# Patient Record
Sex: MALE | Race: WHITE | NOT HISPANIC OR LATINO | Employment: OTHER | URBAN - METROPOLITAN AREA
[De-identification: names, ages, dates, MRNs, and addresses within clinical notes are randomized per-mention and may not be internally consistent; named-entity substitution may affect disease eponyms.]

---

## 2017-08-15 ENCOUNTER — OFFICE VISIT (OUTPATIENT)
Dept: URGENT CARE | Facility: PHYSICIAN GROUP | Age: 32
End: 2017-08-15
Payer: OTHER GOVERNMENT

## 2017-08-15 VITALS
TEMPERATURE: 99.3 F | BODY MASS INDEX: 40.66 KG/M2 | DIASTOLIC BLOOD PRESSURE: 72 MMHG | OXYGEN SATURATION: 94 % | WEIGHT: 284 LBS | HEART RATE: 108 BPM | SYSTOLIC BLOOD PRESSURE: 128 MMHG | HEIGHT: 70 IN | RESPIRATION RATE: 18 BRPM

## 2017-08-15 DIAGNOSIS — E66.01 MORBID OBESITY WITH BMI OF 40.0-44.9, ADULT (HCC): ICD-10-CM

## 2017-08-15 DIAGNOSIS — J02.0 ACUTE STREPTOCOCCAL PHARYNGITIS: ICD-10-CM

## 2017-08-15 LAB
INT CON NEG: NORMAL
INT CON POS: NORMAL
S PYO AG THROAT QL: NORMAL

## 2017-08-15 PROCEDURE — 99214 OFFICE O/P EST MOD 30 MIN: CPT | Performed by: FAMILY MEDICINE

## 2017-08-15 PROCEDURE — 87880 STREP A ASSAY W/OPTIC: CPT | Performed by: FAMILY MEDICINE

## 2017-08-15 RX ORDER — AMOXICILLIN 875 MG/1
TABLET, COATED ORAL
Qty: 20 TAB | Refills: 0 | Status: SHIPPED | OUTPATIENT
Start: 2017-08-15 | End: 2021-07-06

## 2017-08-15 NOTE — MR AVS SNAPSHOT
"        Go Johnson   8/15/2017 8:15 AM   Office Visit   MRN: [id number]    Department:  [facility]   Dept Phone:  [phone]    Description:  Male : 1985   Provider:  Darryl Self M.D.           Reason for Visit     Pharyngitis x 7 days      Allergies as of 8/15/2017     No Known Allergies      You were diagnosed with     Acute streptococcal pharyngitis   [683963]       Morbid obesity with BMI of 40.0-44.9, adult (CMS-[facility])   [[id number]]         Vital Signs     Blood Pressure Pulse Temperature Respirations Height Weight    128/72 mmHg 108 37.4 °C (99.3 °F) 18 1.778 m (5' 10\") 128.822 kg (284 lb)    Body Mass Index Oxygen Saturation Smoking Status             40.75 kg/m2 94% Current Every Day Smoker         Basic Information     Date Of Birth Sex Race Ethnicity Preferred Language    1985 Male White Non- English      Problem List              ICD-10-CM Priority Class Noted - Resolved    Morbid obesity with BMI of 40.0-44.9, adult ([facility]) E66.01, Z68.41   8/15/2017 - Present      Health Maintenance        Date Due Completion Dates    IMM DTaP/Tdap/Td Vaccine (1 - Tdap) 2006 ---    IMM INFLUENZA (1) 2017 ---            Results     POCT Rapid Strep A      Component    Rapid Strep Screen    POS    Internal Control Positive    Valid    Internal Control Negative    Valid                        Current Immunizations     No immunizations on file.      Below and/or attached are the medications your provider expects you to take. Review all of your home medications and newly ordered medications with your provider and/or pharmacist. Follow medication instructions as directed by your provider and/or pharmacist. Please keep your medication list with you and share with your provider. Update the information when medications are discontinued, doses are changed, or new medications (including over-the-counter products) are added; and carry medication information at all times in the event of " emergency situations     Allergies:  No Known Allergies          Medications  Valid as of: August 15, 2017 -  8:37 AM    Generic Name Brand Name Tablet Size Instructions for use    Amoxicillin (Tab) AMOXIL 875 MG 1 TAB TWICE A DAY X 10 DAYS.        Sertraline HCl (Tab) ZOLOFT 100 MG Take 100 mg by mouth every day.        .                 Medicines prescribed today were sent to:     [facility] [address] [place] [address], [place] - [address] [facility]    [address] [place] [address]    Phone: [phone] Fax: [phone]    Open 24 Hours?: No      Medication refill instructions:       If your prescription bottle indicates you have medication refills left, it is not necessary to call your provider’s office. Please contact your pharmacy and they will refill your medication.    If your prescription bottle indicates you do not have any refills left, you may request refills at any time through one of the following ways: The online [facility] system (except Urgent Care), by calling your provider’s office, or by asking your pharmacy to contact your provider’s office with a refill request. Medication refills are processed only during regular business hours and may not be available until the next business day. Your provider may request additional information or to have a follow-up visit with you prior to refilling your medication.   *Please Note: Medication refills are assigned a new Rx number when refilled electronically. Your pharmacy may indicate that no refills were authorized even though a new prescription for the same medication is available at the pharmacy. Please request the medicine by name with the pharmacy before contacting your provider for a refill.           [facility] Access Code: Activation code not generated  Current [facility] Status: Active          Quit Tobacco Information     Do you want to quit using tobacco?    Quitting tobacco decreases risks of cancer, heart and lung disease, increases life expectancy,  improves sense of taste and smell, and increases spending money, among other benefits.    If you are thinking about quitting, we can help.  • [facility] Quit Tobacco Program: [phone]  o Program occurs weekly for four weeks and includes pharmacist consultation on products to support quitting smoking or chewing tobacco. A provider referral is needed for pharmacist consultation.  • Tobacco Users Help Hotline: 9-800QUIT-NOW (176-4841) or https://[place].quitlogix.org/  o Free, confidential telephone and online coaching for [place] residents. Sessions are designed on a schedule that is convenient for you. Eligible clients receive free nicotine replacement therapy.  • Nationally: www.smokefree.gov  o Information and professional assistance to support both immediate and long-term needs as you become, and remain, a non-smoker. Smokefree.gov allows you to choose the help that best fits your needs.

## 2017-08-15 NOTE — PROGRESS NOTES
Chief Complaint:    Chief Complaint   Patient presents with   • Pharyngitis     x 7 days       History of Present Illness:    This is a new problem. Symptoms x 7 days; has sore throat, 8/10 severity. Has had subjective fever. No nasal symptoms or cough.      Review of Systems:    Constitutional: See HPI.  Eyes: Negative for change in vision, photophobia, pain, redness, and discharge.  ENT: See HPI.  Respiratory: Negative for cough, hemoptysis, sputum production, shortness of breath, wheezing, and stridor.    Cardiovascular: Negative for chest pain, palpitations, orthopnea, claudication, leg swelling, and PND.   Gastrointestinal: Negative for abdominal pain, nausea, vomiting, diarrhea, constipation, blood in stool, and melena.   Genitourinary: Negative for dysuria, urinary urgency, urinary frequency, hematuria, and flank pain.   Musculoskeletal: Negative for myalgias, joint pain, neck pain, and back pain.   Skin: Negative for rash and itching.   Neurological: Negative for dizziness, tingling, tremors, sensory change, speech change, focal weakness, seizures, loss of consciousness, and headaches.   Endo: Negative for polydipsia.   Heme: Does not bruise/bleed easily.   Psychiatric/Behavioral: Negative for depression, suicidal ideas, hallucinations, memory loss and substance abuse. The patient is not nervous/anxious and does not have insomnia.      Past Medical History:    History reviewed. No pertinent past medical history.    Past Surgical History:    Past Surgical History   Procedure Laterality Date   • Pilonidal cyst excision     • Pilonidal cyst excision  2008   • Pilonidal cyst excision  12/7/2012     Performed by Ulises Carvalho M.D. at [facility]       Social History:    Social History     Social History   • Marital Status:      Spouse Name: N/A   • Number of Children: N/A   • Years of Education: N/A     Social History Main Topics   • Smoking status: Current Every Day Smoker -- 1.00 packs/day  "for 10 years     Types: Cigarettes   • Smokeless tobacco: Never Used   • Alcohol Use: 0.0 oz/week     0 Standard drinks or equivalent per week      Comment: weekly   • Drug Use: No   • Sexual Activity: Not Asked     Other Topics Concern   • None     Social History Narrative       Family History:    History reviewed. No pertinent family history.    Medications:    Current Outpatient Prescriptions on File Prior to Visit   Medication Sig Dispense Refill   • sertraline (ZOLOFT) 100 MG Tab Take 100 mg by mouth every day.       No current facility-administered medications on file prior to visit.       Allergies:    No Known Allergies      Vitals:    Filed Vitals:    08/15/17 0813   BP: 128/72   Pulse: 108   Temp: 37.4 °C (99.3 °F)   Resp: 18   Height: 1.778 m (5' 10\")   Weight: 128.822 kg (284 lb)   SpO2: 94%       Physical Exam:    Constitutional: Vital signs reviewed. Appears well-developed and well-nourished. No acute distress.   Eyes: Sclera white, conjunctivae clear.   ENT: External ears normal. Hearing normal. Nasal mucosa pink. Lips/teeth are normal. Oral mucosa pink and moist. Posterior pharynx is moderately erythematous with mild-moderate swelling and purulent mucus.  Neck: Neck supple.   Pulmonary/Chest: Respirations non-labored.  Lymph: Cervical nodes without tenderness or enlargement.  Musculoskeletal: Normal gait. Normal range of motion. No muscular atrophy or weakness.  Neurological: Alert and oriented to person, place, and time. Muscle tone normal. Coordination normal.   Skin: No rashes or lesions. Warm, dry, normal turgor.  Psychiatric: Normal mood and affect. Behavior is normal. Judgment and thought content normal.     Diagnostics:    POCT RAPID STREP A (Order #[id number]) on 8/15/17       Component Results      Component     Rapid Strep Screen     POS     Internal Control Positive     Valid     Internal Control Negative     Valid         Last Resulted Time     Tue Aug 15, 2017  8:31 AM       Assessment / " Plan:    1. Acute streptococcal pharyngitis  - POCT Rapid Strep A  - amoxicillin (AMOXIL) 875 MG tablet; 1 TAB TWICE A DAY X 10 DAYS.  Dispense: 20 Tab; Refill: 0    2. Morbid obesity with BMI of 40.0-44.9, adult (CMS-[facility])  - Patient identified as having weight management issue.  Appropriate orders and counseling given.      Discussed with him DDX and management options.    Agreeable to medication prescribed.    Follow-up with PCP or urgent care if getting worse or not better with above.

## 2021-07-06 ENCOUNTER — OFFICE VISIT (OUTPATIENT)
Dept: URGENT CARE | Facility: CLINIC | Age: 36
End: 2021-07-06
Payer: COMMERCIAL

## 2021-07-06 VITALS
TEMPERATURE: 97.8 F | SYSTOLIC BLOOD PRESSURE: 126 MMHG | HEART RATE: 98 BPM | RESPIRATION RATE: 17 BRPM | WEIGHT: 310 LBS | HEIGHT: 70 IN | OXYGEN SATURATION: 95 % | BODY MASS INDEX: 44.38 KG/M2 | DIASTOLIC BLOOD PRESSURE: 78 MMHG

## 2021-07-06 DIAGNOSIS — H00.031 CELLULITIS OF RIGHT UPPER EYELID: ICD-10-CM

## 2021-07-06 PROBLEM — L03.213 PRESEPTAL CELLULITIS OF RIGHT UPPER EYELID: Status: ACTIVE | Noted: 2021-07-06

## 2021-07-06 PROCEDURE — 99203 OFFICE O/P NEW LOW 30 MIN: CPT | Performed by: NURSE PRACTITIONER

## 2021-07-06 RX ORDER — AMOXICILLIN AND CLAVULANATE POTASSIUM 875; 125 MG/1; MG/1
1 TABLET, FILM COATED ORAL 2 TIMES DAILY
Qty: 14 TABLET | Refills: 0 | Status: SHIPPED
Start: 2021-07-06 | End: 2021-07-08

## 2021-07-06 RX ORDER — SULFAMETHOXAZOLE AND TRIMETHOPRIM 800; 160 MG/1; MG/1
1 TABLET ORAL 2 TIMES DAILY
Qty: 14 TABLET | Refills: 0 | Status: SHIPPED
Start: 2021-07-06 | End: 2021-07-08

## 2021-07-06 ASSESSMENT — ENCOUNTER SYMPTOMS
EYE DISCHARGE: 0
HEADACHES: 0
ROS SKIN COMMENTS: RIGHT UPPER EYELID SWELLING
PHOTOPHOBIA: 0
EYE PAIN: 0
EYE REDNESS: 0
TINGLING: 0
NAUSEA: 0
FEVER: 0
BLURRED VISION: 0
SENSORY CHANGE: 0
DOUBLE VISION: 0
CHILLS: 0

## 2021-07-06 NOTE — PROGRESS NOTES
Subjective:      Go Johnson is a 35 y.o. male who presents with Eye Problem (stye on right eye x 3 days )            HPI   New problem.  Go is a 35-year-old male who presents with swelling to his right upper eyelid x3 days.  He reports associated erythema and mild tenderness to this area.  He denies any redness of the eye itself or discharge.  He denies blurred or double vision, fever, chills, headache, or nausea.  He has not done any treatment for this at this point.  Patient has no known allergies.  No current outpatient medications on file prior to visit.     No current facility-administered medications on file prior to visit.     Social History     Socioeconomic History   • Marital status:      Spouse name: Not on file   • Number of children: Not on file   • Years of education: Not on file   • Highest education level: Not on file   Occupational History   • Not on file   Tobacco Use   • Smoking status: Current Every Day Smoker     Packs/day: 1.00     Years: 10.00     Pack years: 10.00     Types: Cigarettes   • Smokeless tobacco: Never Used   Substance and Sexual Activity   • Alcohol use: Yes     Alcohol/week: 0.0 oz     Comment: weekly   • Drug use: No   • Sexual activity: Not on file   Other Topics Concern   • Not on file   Social History Narrative   • Not on file     Social Determinants of Health     Financial Resource Strain:    • Difficulty of Paying Living Expenses:    Food Insecurity:    • Worried About Running Out of Food in the Last Year:    • Ran Out of Food in the Last Year:    Transportation Needs:    • Lack of Transportation (Medical):    • Lack of Transportation (Non-Medical):    Physical Activity:    • Days of Exercise per Week:    • Minutes of Exercise per Session:    Stress:    • Feeling of Stress :    Social Connections:    • Frequency of Communication with Friends and Family:    • Frequency of Social Gatherings with Friends and Family:    • Attends Zoroastrianism Services:    • Active  "Member of Clubs or Organizations:    • Attends Club or Organization Meetings:    • Marital Status:    Intimate Partner Violence:    • Fear of Current or Ex-Partner:    • Emotionally Abused:    • Physically Abused:    • Sexually Abused:      Breast Cancer-related family history is not on file.      Review of Systems   Constitutional: Negative for chills, fever and malaise/fatigue.   Eyes: Negative for blurred vision, double vision, photophobia, pain, discharge and redness.   Gastrointestinal: Negative for nausea.   Skin:        Right upper eyelid swelling   Neurological: Negative for tingling, sensory change and headaches.          Objective:     /78 (BP Location: Left arm, Patient Position: Sitting, BP Cuff Size: Adult)   Pulse 98   Temp 36.6 °C (97.8 °F) (Temporal)   Resp 17   Ht 1.778 m (5' 10\")   Wt (!) 141 kg (310 lb)   SpO2 95%   BMI 44.48 kg/m²      Physical Exam  Vitals and nursing note reviewed.   Constitutional:       Appearance: Normal appearance.   Eyes:      General:         Right eye: No discharge.         Left eye: No discharge.      Extraocular Movements: Extraocular movements intact.      Conjunctiva/sclera: Conjunctivae normal.      Pupils: Pupils are equal, round, and reactive to light.      Comments: Right upper eyelid with erythema across the lid with swelling and mild tenderness to palpation.   Cardiovascular:      Rate and Rhythm: Normal rate and regular rhythm.      Heart sounds: No murmur heard.     Pulmonary:      Effort: Pulmonary effort is normal.      Breath sounds: Normal breath sounds.   Musculoskeletal:         General: Normal range of motion.   Skin:     General: Skin is warm and dry.   Neurological:      General: No focal deficit present.      Mental Status: He is alert and oriented to person, place, and time.   Psychiatric:         Mood and Affect: Mood normal.                        Assessment/Plan:        1. Cellulitis of right upper eyelid  amoxicillin-clavulanate " (AUGMENTIN) 875-125 MG Tab    sulfamethoxazole-trimethoprim (BACTRIM DS) 800-160 MG tablet     Patient may take ibuprofen for any discomfort.  I have placed him on a 7-day course of both Bactrim and Augmentin and advised follow-up if symptoms are not improving in the next 48 hours.

## 2021-07-08 ENCOUNTER — OFFICE VISIT (OUTPATIENT)
Dept: URGENT CARE | Facility: CLINIC | Age: 36
End: 2021-07-08
Payer: COMMERCIAL

## 2021-07-08 VITALS
DIASTOLIC BLOOD PRESSURE: 80 MMHG | SYSTOLIC BLOOD PRESSURE: 128 MMHG | HEART RATE: 91 BPM | TEMPERATURE: 98.3 F | BODY MASS INDEX: 44.38 KG/M2 | RESPIRATION RATE: 17 BRPM | WEIGHT: 310 LBS | OXYGEN SATURATION: 95 % | HEIGHT: 70 IN

## 2021-07-08 DIAGNOSIS — H00.011 HORDEOLUM EXTERNUM OF RIGHT UPPER EYELID: ICD-10-CM

## 2021-07-08 PROCEDURE — 99213 OFFICE O/P EST LOW 20 MIN: CPT | Performed by: FAMILY MEDICINE

## 2021-07-08 RX ORDER — CLINDAMYCIN HYDROCHLORIDE 300 MG/1
300 CAPSULE ORAL 3 TIMES DAILY
Qty: 15 CAPSULE | Refills: 0 | Status: SHIPPED | OUTPATIENT
Start: 2021-07-08 | End: 2021-07-13

## 2021-07-08 RX ORDER — NEOMYCIN POLYMYXIN B SULFATES AND DEXAMETHASONE 3.5; 10000; 1 MG/ML; [USP'U]/ML; MG/ML
2 SUSPENSION/ DROPS OPHTHALMIC 3 TIMES DAILY
Qty: 5 ML | Refills: 0 | Status: SHIPPED | OUTPATIENT
Start: 2021-07-08 | End: 2021-07-13

## 2021-07-08 ASSESSMENT — ENCOUNTER SYMPTOMS: EYE REDNESS: 1

## 2021-07-09 NOTE — PROGRESS NOTES
"Subjective:      Go Johnson is a 35 y.o. male who presents with Follow-Up (follow up for cellulitis on right eye lid , getting worse )      - This is a pleasant and nontoxic appearing 35 y.o. male with c/o here for 48 hr cellulitis recheck. Says feels same, maybe even getting worse. No NVFC. Has been taking his abx.       ALLERGIES:  Patient has no known allergies.     PMH:  History reviewed. No pertinent past medical history.     PSH:  Past Surgical History:   Procedure Laterality Date   • PILONIDAL CYST EXCISION  12/7/2012    Performed by Ulises Carvalho M.D. at SURGERY [facility]   • PILONIDAL CYST EXCISION  2008   • PILONIDAL CYST EXCISION         MEDS:    Current Outpatient Medications:   •  neomycin-polymyxin-dexamethasone (MAXITROL) 0.1 % ophthalmic suspension, Administer 2 Drops into the right eye 3 times a day for 5 days., Disp: 5 mL, Rfl: 0  •  clindamycin (CLEOCIN) 300 MG Cap, Take 1 capsule by mouth 3 times a day for 5 days., Disp: 15 capsule, Rfl: 0    ** I have documented what I find to be significant in regards to past medical, social, family and surgical history  in my HPI or under PMH/PSH/FH review section, otherwise it is noncontributory **       HPI    Review of Systems   Eyes: Positive for redness.   All other systems reviewed and are negative.       Objective:     /80 (BP Location: Left arm, Patient Position: Sitting, BP Cuff Size: Large adult)   Pulse 91   Temp 36.8 °C (98.3 °F) (Temporal)   Resp 17   Ht 1.778 m (5' 10\")   Wt (!) 141 kg (310 lb)   SpO2 95%   BMI 44.48 kg/m²      Physical Exam  Vitals and nursing note reviewed.   Constitutional:       General: He is not in acute distress.     Appearance: Normal appearance. He is well-developed.   HENT:      Head: Normocephalic and atraumatic.   Eyes:      General: No scleral icterus.       Comments: Rt eye: + internal sty, some lid edema/erythema TTP   Cardiovascular:      Heart sounds: Normal heart sounds. No murmur " heard.     Pulmonary:      Effort: Pulmonary effort is normal. No respiratory distress.   Skin:     Coloration: Skin is not jaundiced or pale.   Neurological:      Mental Status: He is alert.      Motor: No abnormal muscle tone.   Psychiatric:         Mood and Affect: Mood normal.         Behavior: Behavior normal.       Assessment/Plan:          1. Hordeolum externum of right upper eyelid  neomycin-polymyxin-dexamethasone (MAXITROL) 0.1 % ophthalmic suspension    clindamycin (CLEOCIN) 300 MG Cap       - Dx, plan & d/c instructions discussed w/ patient  - hot compress  - Rest, stay hydrated OTC Motrin and/or Tylenol as needed  - E.R. precautions discussed     Asked to kindly follow up with their PCP's office in 2-3 days for a recheck, ER if not improving or feeling/getting worse.    Any realistic side effects of medications that may have been given today reviewed.     Patient left in stable condition

## 2025-04-15 ENCOUNTER — OFFICE VISIT (OUTPATIENT)
Dept: URGENT CARE | Facility: CLINIC | Age: 40
End: 2025-04-15
Payer: COMMERCIAL

## 2025-04-15 VITALS
OXYGEN SATURATION: 95 % | RESPIRATION RATE: 18 BRPM | DIASTOLIC BLOOD PRESSURE: 100 MMHG | TEMPERATURE: 97.5 F | SYSTOLIC BLOOD PRESSURE: 142 MMHG | WEIGHT: 297.6 LBS | BODY MASS INDEX: 42.61 KG/M2 | HEIGHT: 70 IN | HEART RATE: 88 BPM

## 2025-04-15 DIAGNOSIS — S16.1XXA STRAIN OF NECK MUSCLE, INITIAL ENCOUNTER: ICD-10-CM

## 2025-04-15 PROCEDURE — 99203 OFFICE O/P NEW LOW 30 MIN: CPT | Performed by: NURSE PRACTITIONER

## 2025-04-15 PROCEDURE — 3077F SYST BP >= 140 MM HG: CPT | Performed by: NURSE PRACTITIONER

## 2025-04-15 PROCEDURE — 3080F DIAST BP >= 90 MM HG: CPT | Performed by: NURSE PRACTITIONER

## 2025-04-15 RX ORDER — CYCLOBENZAPRINE HCL 10 MG
10 TABLET ORAL EVERY 8 HOURS PRN
Qty: 30 TABLET | Refills: 0 | Status: SHIPPED | OUTPATIENT
Start: 2025-04-15

## 2025-04-15 RX ORDER — PREDNISONE 20 MG/1
40 TABLET ORAL DAILY
Qty: 10 TABLET | Refills: 0 | Status: SHIPPED | OUTPATIENT
Start: 2025-04-15 | End: 2025-04-20

## 2025-04-15 ASSESSMENT — ENCOUNTER SYMPTOMS
NECK PAIN: 1
CONSTITUTIONAL NEGATIVE: 1
NEUROLOGICAL NEGATIVE: 1
WEAKNESS: 0
SENSORY CHANGE: 0
BACK PAIN: 0

## 2025-04-15 ASSESSMENT — VISUAL ACUITY: OU: 1

## 2025-04-15 NOTE — PROGRESS NOTES
Subjective:     Go Johnson is a 39 y.o. male who presents for Neck Injury (X3days, both sides of neck pain down shoulders)       Neck Pain   This is a new problem. The problem has been gradually worsening. Pertinent negatives include no weakness.     Ambient listening software ([employer]) used during this encounter with the consent of the patient. The following text is AI-assisted:  History of Present Illness  The patient presents for evaluation of neck pain.    Neck Pain  On Sunday, while digging up a sprinkler head, he experienced a sudden, sharp pain in the left side of his neck [and right]. The pain has progressively worsened, causing difficulty in turning his head [due to pain, but can still turn his head]. No history of similar issues, numbness, or tingling.    Ibuprofen and ice not helping.    Review of Systems   Constitutional: Negative.    Musculoskeletal:  Positive for neck pain. Negative for back pain.   Neurological: Negative.  Negative for sensory change and weakness.   All other systems reviewed and are negative.  Refer to HPI for additional details.    During this visit, appropriate PPE was worn, and hand hygiene was performed.    PMH:  has no past medical history on file.    MEDS:   Current Outpatient Medications:     cyclobenzaprine (FLEXERIL) 10 mg Tab, Take 1 Tablet by mouth every 8 hours as needed for Muscle Spasms., Disp: 30 Tablet, Rfl: 0    predniSONE (DELTASONE) 20 MG Tab, Take 2 Tablets by mouth every day for 5 days. Do not take with NSAIDs such as ibuprofen., Disp: 10 Tablet, Rfl: 0    ALLERGIES: No Known Allergies  SURGHX:   Past Surgical History:   Procedure Laterality Date    PILONIDAL CYST EXCISION  12/7/2012    Performed by Ulises Carvalho M.D. at SURGERY [facility]    PILONIDAL CYST EXCISION  2008    PILONIDAL CYST EXCISION       SOCHX:  reports that he has been smoking cigarettes. He has a 10 pack-year smoking history. He has never used smokeless tobacco. He reports  "current alcohol use. He reports that he does not use drugs.    FH: Per HPI as applicable/pertinent.      Objective:     BP (!) 142/100 (BP Location: Left arm, Patient Position: Sitting, BP Cuff Size: Adult)   Pulse 88   Temp 36.4 °C (97.5 °F) (Temporal)   Resp 18   Ht 1.778 m (5' 10\")   Wt (!) 135 kg (297 lb 9.6 oz)   SpO2 95%   BMI 42.70 kg/m²     Physical Exam  Nursing note reviewed.   Constitutional:       General: He is not in acute distress.     Appearance: He is well-developed. He is not ill-appearing or toxic-appearing.   Eyes:      General: Vision grossly intact.   Cardiovascular:      Rate and Rhythm: Normal rate.   Pulmonary:      Effort: Pulmonary effort is normal. No respiratory distress.   Musculoskeletal:         General: No deformity. Normal range of motion.      Cervical back: Spasms and tenderness (Bilateral paraspinals) present. No swelling or deformity. Normal range of motion.   Skin:     General: Skin is warm and dry.      Coloration: Skin is not pale.   Neurological:      Mental Status: He is alert and oriented to person, place, and time.      Motor: No weakness.      Gait: Gait normal.   Psychiatric:         Behavior: Behavior normal. Behavior is cooperative.       Assessment/Plan:     1. Strain of neck muscle, initial encounter  - cyclobenzaprine (FLEXERIL) 10 mg Tab; Take 1 Tablet by mouth every 8 hours as needed for Muscle Spasms.  Dispense: 30 Tablet; Refill: 0  - predniSONE (DELTASONE) 20 MG Tab; Take 2 Tablets by mouth every day for 5 days. Do not take with NSAIDs such as ibuprofen.  Dispense: 10 Tablet; Refill: 0    Rx as above sent electronically.     AI-assisted A&P:   Assessment & Plan    - Discontinue ibuprofen  - Use heat therapy for 20 minutes  - Gentle stretching and massage  - Prescribe muscle relaxant and prednisone  - If no improvement in 3-5 days or worsening, consider physical therapy or imaging    Monitor. Warning signs reviewed. Immediate return precautions advised. "     Differential diagnosis, natural history, supportive care, over-the-counter symptom management per 's instructions, close monitoring, and indications for immediate follow-up discussed.     All questions answered. Patient agrees with the plan of care.    Discharge summary provided via [employer].    Work note declined.

## 2025-04-23 ENCOUNTER — OFFICE VISIT (OUTPATIENT)
Dept: URGENT CARE | Facility: CLINIC | Age: 40
End: 2025-04-23
Payer: COMMERCIAL

## 2025-04-23 ENCOUNTER — RESULTS FOLLOW-UP (OUTPATIENT)
Dept: URGENT CARE | Facility: CLINIC | Age: 40
End: 2025-04-23

## 2025-04-23 VITALS
HEIGHT: 67 IN | DIASTOLIC BLOOD PRESSURE: 80 MMHG | WEIGHT: 295.6 LBS | BODY MASS INDEX: 46.39 KG/M2 | OXYGEN SATURATION: 93 % | RESPIRATION RATE: 16 BRPM | SYSTOLIC BLOOD PRESSURE: 118 MMHG | TEMPERATURE: 97.1 F | HEART RATE: 88 BPM

## 2025-04-23 DIAGNOSIS — B34.9 NONSPECIFIC SYNDROME SUGGESTIVE OF VIRAL ILLNESS: ICD-10-CM

## 2025-04-23 DIAGNOSIS — R10.9 FLANK PAIN: ICD-10-CM

## 2025-04-23 LAB
APPEARANCE UR: CLEAR
BILIRUB UR STRIP-MCNC: NEGATIVE MG/DL
COLOR UR AUTO: YELLOW
FLUAV RNA SPEC QL NAA+PROBE: NEGATIVE
FLUBV RNA SPEC QL NAA+PROBE: NEGATIVE
GLUCOSE UR STRIP.AUTO-MCNC: NEGATIVE MG/DL
KETONES UR STRIP.AUTO-MCNC: NEGATIVE MG/DL
LEUKOCYTE ESTERASE UR QL STRIP.AUTO: NEGATIVE
NITRITE UR QL STRIP.AUTO: NEGATIVE
PH UR STRIP.AUTO: 7 [PH] (ref 5–8)
PROT UR QL STRIP: 30 MG/DL
RBC UR QL AUTO: NEGATIVE
RSV RNA SPEC QL NAA+PROBE: NEGATIVE
SARS-COV-2 RNA RESP QL NAA+PROBE: NEGATIVE
SP GR UR STRIP.AUTO: 1.01
UROBILINOGEN UR STRIP-MCNC: 0.2 MG/DL

## 2025-04-23 PROCEDURE — 81002 URINALYSIS NONAUTO W/O SCOPE: CPT

## 2025-04-23 PROCEDURE — 99214 OFFICE O/P EST MOD 30 MIN: CPT

## 2025-04-23 PROCEDURE — 3074F SYST BP LT 130 MM HG: CPT

## 2025-04-23 PROCEDURE — 0241U POCT CEPHEID COV-2, FLU A/B, RSV - PCR: CPT

## 2025-04-23 PROCEDURE — 3079F DIAST BP 80-89 MM HG: CPT

## 2025-04-23 RX ORDER — ANASTROZOLE 1 MG/1
1 TABLET ORAL DAILY
COMMUNITY

## 2025-04-23 ASSESSMENT — ENCOUNTER SYMPTOMS
FEVER: 1
CHILLS: 1
SORE THROAT: 0
COUGH: 0
MYALGIAS: 1
BACK PAIN: 1

## 2025-04-23 NOTE — PROGRESS NOTES
Subjective:     CHIEF COMPLAINT  Chief Complaint   Patient presents with    Flank Pain     X1day lower back/flank pain/chills/sweats/       HPI  Go Johnson is a very pleasant 39 y.o. male who presents with bilateral mid back pain that he first woke up with yesterday.  Throughout the day, his entire back began hurting.  He tried managing his symptoms with ibuprofen and Tylenol with slight improvement in symptoms.  This morning, his symptoms worsened.  He also reports pain to his knees, elbows, and diffuse joint pain.  He tried taking Tylenol and ibuprofen today without improvement in symptoms.  He has also been experiencing hot flashes and chills.  He has been urinating normally and denies any UTI symptoms.  He has been tolerating fluids well.  He denies any nasal congestion, sore throat, or cough.  He has not had any confirmed sick contacts.    REVIEW OF SYSTEMS  Review of Systems   Constitutional:  Positive for chills, fever (Tactile) and malaise/fatigue.   HENT:  Negative for congestion and sore throat.    Respiratory:  Negative for cough.    Genitourinary:  Negative for dysuria, frequency and urgency.   Musculoskeletal:  Positive for back pain, joint pain and myalgias.       PAST MEDICAL HISTORY  Patient Active Problem List    Diagnosis Date Noted    Preseptal cellulitis of right upper eyelid 07/06/2021    Morbid obesity with BMI of 40.0-44.9, adult ([facility]) 08/15/2017       SURGICAL HISTORY   has a past surgical history that includes pilonidal cyst excision; pilonidal cyst excision (2008); and pilonidal cyst excision (12/7/2012).    ALLERGIES  No Known Allergies    CURRENT MEDICATIONS  Home Medications       Reviewed by Marcy Pool P.A.-C. (Physician Assistant) on 04/23/25 at 0928  Med List Status: <None>     Medication Last Dose Status   anastrozole (ARIMIDEX) 1 MG Tab Taking Active   cyclobenzaprine (FLEXERIL) 10 mg Tab Taking Active                    SOCIAL HISTORY  Social History     Tobacco Use  "   Smoking status: Former     Current packs/day: 1.00     Average packs/day: 1 pack/day for 10.0 years (10.0 ttl pk-yrs)     Types: Cigarettes    Smokeless tobacco: Never   Vaping Use    Vaping status: Never Used   Substance and Sexual Activity    Alcohol use: Yes     Alcohol/week: 0.0 oz     Comment: weekly    Drug use: No    Sexual activity: Not on file       FAMILY HISTORY  History reviewed. No pertinent family history.       Objective:     VITAL SIGNS: /80   Pulse 88   Temp 36.2 °C (97.1 °F) (Temporal)   Resp 16   Ht 1.702 m (5' 7\")   Wt (!) 134 kg (295 lb 9.6 oz)   SpO2 93%   BMI 46.30 kg/m²     PHYSICAL EXAM  Physical Exam  Vitals reviewed.   Constitutional:       General: He is not in acute distress.     Appearance: Normal appearance. He is not ill-appearing or toxic-appearing.   HENT:      Head: Normocephalic and atraumatic.      Nose: Nose normal.      Mouth/Throat:      Mouth: Mucous membranes are moist.      Pharynx: Posterior oropharyngeal erythema present. No oropharyngeal exudate.   Eyes:      Conjunctiva/sclera: Conjunctivae normal.      Pupils: Pupils are equal, round, and reactive to light.   Cardiovascular:      Rate and Rhythm: Normal rate and regular rhythm.      Heart sounds: Normal heart sounds.   Pulmonary:      Effort: Pulmonary effort is normal. No respiratory distress.      Breath sounds: Normal breath sounds. No stridor. No wheezing, rhonchi or rales.   Abdominal:      Tenderness: There is no right CVA tenderness or left CVA tenderness.   Musculoskeletal:      Cervical back: No tenderness or bony tenderness.      Thoracic back: No tenderness or bony tenderness.      Lumbar back: No tenderness or bony tenderness.   Lymphadenopathy:      Cervical: No cervical adenopathy.   Skin:     General: Skin is warm and dry.      Coloration: Skin is not jaundiced or pale.      Findings: No rash.   Neurological:      General: No focal deficit present.      Mental Status: He is alert and " oriented to person, place, and time.   Psychiatric:         Mood and Affect: Mood normal.         Assessment/Plan:     1. Flank pain  - POCT Urinalysis    2. Nonspecific syndrome suggestive of viral illness  - POCT CoV-2, Flu A/B, RSV by PCR    Other orders  - anastrozole (ARIMIDEX) 1 MG Tab; Take 1 mg by mouth every day.  - Tylenol/ibuprofen over-the-counter for discomfort  - Rest and hydrate  - Return to clinic if symptoms worsen or fail to resolve  - Follow-up with primary care provider for repeat urinalysis due to small protein in urine    MDM/Comments:  Patient has stable vital signs and is non-toxic appearing.  Urinalysis obtained in office positive for small protein.  Negative for blood, leukocytes, or nitrates.  Patient does not appear to be experiencing acute cystitis/pyelonephritis.  Patient did not display CVA tenderness on physical exam.  Viral testing for COVID, influenza, and RSV have been performed in the office with negative results.  Discussed with patient that I suspect his symptoms are due to viral origin.  Discussed following up with his primary care provider for repeat urinalysis.  Discussed supportive care with hydration, rest, Tylenol/Ibuprofen as needed. Patient demonstrated understanding of treatment plan at this time and will RTC if symptoms worsen or fail to resolve.     Differential diagnosis, natural history, supportive care, and indications for immediate follow-up discussed. All questions answered. Patient agrees with the plan of care.    Follow-up as needed if symptoms worsen or fail to improve to PCP, Urgent care or Emergency Room.    I have personally reviewed prior external notes and test results pertinent to today's visit.  I have independently reviewed and interpreted all diagnostics ordered during this urgent care acute visit.   Discussed management options (risks,benefits, and alternatives to treatment). Pt expresses understanding and the treatment plan was agreed upon.  Questions were encouraged and answered to pt's satisfaction.    Please note that this dictation was created using voice recognition software. I have made a reasonable attempt to correct obvious errors, but I expect that there are errors of grammar and possibly content that I did not discover before finalizing the note.